# Patient Record
Sex: MALE | Race: WHITE | NOT HISPANIC OR LATINO | Employment: FULL TIME | URBAN - METROPOLITAN AREA
[De-identification: names, ages, dates, MRNs, and addresses within clinical notes are randomized per-mention and may not be internally consistent; named-entity substitution may affect disease eponyms.]

---

## 2023-10-01 PROBLEM — M25.662 STIFFNESS OF LEFT KNEE, NOT ELSEWHERE CLASSIFIED: Status: ACTIVE | Noted: 2023-10-01

## 2023-10-01 PROBLEM — M25.562 KNEE PAIN, LEFT: Status: ACTIVE | Noted: 2023-10-01

## 2023-10-01 PROBLEM — M25.469 EDEMA OF KNEE: Status: ACTIVE | Noted: 2023-10-01

## 2023-10-01 PROBLEM — S83.207A TEAR OF MENISCUS OF LEFT KNEE: Status: ACTIVE | Noted: 2023-10-01

## 2023-10-01 PROBLEM — S83.512A RUPTURE OF ANTERIOR CRUCIATE LIGAMENT OF LEFT KNEE: Status: ACTIVE | Noted: 2023-10-01

## 2023-10-04 ENCOUNTER — TREATMENT (OUTPATIENT)
Dept: PHYSICAL THERAPY | Facility: CLINIC | Age: 38
End: 2023-10-04
Payer: COMMERCIAL

## 2023-10-04 DIAGNOSIS — M25.469 EFFUSION OF LOWER LEG JOINT: ICD-10-CM

## 2023-10-04 DIAGNOSIS — M25.562 LEFT KNEE PAIN: Primary | ICD-10-CM

## 2023-10-04 DIAGNOSIS — S83.207D TEAR OF MENISCUS OF LEFT KNEE, SUBSEQUENT ENCOUNTER: ICD-10-CM

## 2023-10-04 DIAGNOSIS — M25.662 KNEE STIFF, LEFT: ICD-10-CM

## 2023-10-04 PROCEDURE — 97110 THERAPEUTIC EXERCISES: CPT | Mod: CQ,GP

## 2023-10-04 PROCEDURE — 97010 HOT OR COLD PACKS THERAPY: CPT | Mod: CQ,GP

## 2023-10-04 ASSESSMENT — PAIN - FUNCTIONAL ASSESSMENT: PAIN_FUNCTIONAL_ASSESSMENT: 0-10

## 2023-10-04 NOTE — PROGRESS NOTES
Physical Therapy    Physical Therapy Treatment    Patient Name: Damir Vazquez  MRN: [id number]  Today's Date: 10/4/2023         Assessment:  Pt with slow progress in ROM and quad strength thus far d/t non compliance with HEP/ice.   Continued emphasis on HEP and compliance for adequate ROM and quad strength going forward with protocol and progressions. Also, emphasized compliance with icing knee regularly for edema management. Pt voiced understanding. L quad lag present with SLR.    Plan:   Cont to progress per post op protocol     Current Problem  1. Left knee pain        2. Knee stiff, left        3. Tear of meniscus of left knee, subsequent encounter        4. Effusion of lower leg joint            Subjective   Pt reports he is a bit concerned about knee ext ROM. Does not feel like it is improving. Flex ROM feels better.  Has not been performing HEP or icing.       Precautions   Post op protocol  Pain  Pain Assessment: 0-10  1-2/10 tightness L knee     Objective     L knee ext AROM: -1*  L knee flex A/AROM with strap: 101*      Treatments:  Therapeutic exercise (27698):   Upright bike ROM (high seat) x5 min   QS with towel under ankle 2x10 reps 5'' hold  Heel slides with strap 2x10  L hip abd brace locked 2x10  L prone hip ext 2x10   HS sets 2x10 5'' hold   Calf /HS belt stretch x1 min       Manual Therapy (14807):   patella mobs all diretcions     Modalities:  Game ready to L knee x10 min

## 2023-10-09 ENCOUNTER — DOCUMENTATION (OUTPATIENT)
Dept: PHYSICAL THERAPY | Facility: CLINIC | Age: 38
End: 2023-10-09
Payer: COMMERCIAL

## 2023-10-09 NOTE — PROGRESS NOTES
Physical Therapy                 Therapy Communication Note    Patient Name: Damir Vazquez  MRN: [id number]  Today's Date: 10/9/2023     Discipline: Physical Therapy    Missed Visit Reason:  no show    Missed Time: No Show    Comment:

## 2023-10-11 ENCOUNTER — TREATMENT (OUTPATIENT)
Dept: PHYSICAL THERAPY | Facility: CLINIC | Age: 38
End: 2023-10-11
Payer: COMMERCIAL

## 2023-10-11 DIAGNOSIS — S83.207D TEAR OF MENISCUS OF LEFT KNEE, SUBSEQUENT ENCOUNTER: ICD-10-CM

## 2023-10-11 DIAGNOSIS — M25.562 LEFT KNEE PAIN: Primary | ICD-10-CM

## 2023-10-11 DIAGNOSIS — M25.469 EFFUSION OF LOWER LEG JOINT: ICD-10-CM

## 2023-10-11 DIAGNOSIS — M25.662 KNEE STIFF, LEFT: ICD-10-CM

## 2023-10-11 PROCEDURE — 97110 THERAPEUTIC EXERCISES: CPT | Mod: CQ,GP

## 2023-10-11 PROCEDURE — 97010 HOT OR COLD PACKS THERAPY: CPT | Mod: CQ,GP

## 2023-10-11 ASSESSMENT — PAIN SCALES - GENERAL: PAINLEVEL_OUTOF10: 2

## 2023-10-11 ASSESSMENT — PAIN - FUNCTIONAL ASSESSMENT: PAIN_FUNCTIONAL_ASSESSMENT: 0-10

## 2023-10-11 NOTE — PROGRESS NOTES
Physical Therapy  Physical Therapy    Physical Therapy Treatment    Patient Name: Damir Vazquez  MRN: [id number]  Today's Date: 10/11/2023  Time Calculation  Start Time: 1045  Stop Time: 1140  Time Calculation (min): 55 min    Current Problem  Problem List Items Addressed This Visit    None  Visit Diagnoses         Codes    Left knee pain    -  Primary M25.562    Knee stiff, left     M25.662    Tear of meniscus of left knee, subsequent encounter     S83.207D    Effusion of lower leg joint     M25.469             Subjective   Saw surgeon this AM. He was cleared for WBAT with brace locked in extension.  RTW on 10/16.    QS and ROM is improving since he has been working on it more at home.   Would like to get more knee extension so it is comparable to R LE.     Precautions  Precautions  Precautions Comment: post op protocol  Pain  Pain Assessment: 0-10  Pain Score: 2  Pain Location: Knee  Pain Orientation: Left    Objective   L knee ext AROM: 0*  L knee flex A/AROM with strap: 113*    Treatments:  Therapeutic exercise (93227):   Upright bike ROM (high seat) x5 min    L HSS x1 min , seated   L QS with towel under ankle 2x10 reps 5'' hold  L Heel slides with strap 2x10  Standing weight shift x2 min   Standing L hip abd, ext x10  Fitter board calf stretch x1 min   Seated HR/TR 2x10   Ambulation with BL crutches around clinic    Manual Therapy (53762):   patella mobs all diretcions     Modalities:  Game ready/CP to L knee x10 min      Assessment:  Pt progressing since last visit. Demonstrated improved knee flex and ext ROM. Improved QS as well however quad lag with SLR still present. Educated pt on continued emphasis on QS and SLR to build foundation for Wb'ing.  Progressed Wb'ing and gait with crutches today. Pt hesitant but did well as tx went on. Fatigued with pain and edema post tx. Responds well to pain and edema management.     Plan:   Cont to progress per post op protocol

## 2023-10-16 ENCOUNTER — TREATMENT (OUTPATIENT)
Dept: PHYSICAL THERAPY | Facility: CLINIC | Age: 38
End: 2023-10-16
Payer: COMMERCIAL

## 2023-10-16 DIAGNOSIS — M25.662 STIFFNESS OF LEFT KNEE, NOT ELSEWHERE CLASSIFIED: ICD-10-CM

## 2023-10-16 DIAGNOSIS — S83.207D UNSPECIFIED TEAR OF UNSPECIFIED MENISCUS, CURRENT INJURY, LEFT KNEE, SUBSEQUENT ENCOUNTER: ICD-10-CM

## 2023-10-16 DIAGNOSIS — M25.469 EFFUSION, UNSPECIFIED KNEE: ICD-10-CM

## 2023-10-16 DIAGNOSIS — M25.562 PAIN IN LEFT KNEE: ICD-10-CM

## 2023-10-16 PROCEDURE — 97110 THERAPEUTIC EXERCISES: CPT | Mod: CQ,GP

## 2023-10-16 ASSESSMENT — PAIN SCALES - GENERAL: PAINLEVEL_OUTOF10: 0 - NO PAIN

## 2023-10-16 ASSESSMENT — PAIN - FUNCTIONAL ASSESSMENT: PAIN_FUNCTIONAL_ASSESSMENT: 0-10

## 2023-10-16 NOTE — PROGRESS NOTES
Physical Therapy  Physical Therapy    Physical Therapy Treatment    Patient Name: Damir Vazquez  MRN: [id number]  Today's Date: 10/16/2023  Time Calculation  Start Time: 0920  Stop Time: 1000  Time Calculation (min): 40 min    Current Problem  Problem List Items Addressed This Visit             ICD-10-CM    Stiffness of left knee, not elsewhere classified M25.662     Other Visit Diagnoses         Codes    Unspecified tear of unspecified meniscus, current injury, left knee, subsequent encounter     S83.207D    Effusion, unspecified knee     M25.469    Pain in left knee     M25.562               Subjective   Pt has been working on increasing Wb'ing in L LE. Doing well with quad weakness, as expected. ROM is improving as well. Has been focusing on heel to toe as much as he can with brace locked.  RTW today.     Precautions  Precautions  Precautions Comment: post op protocol  Pain  Pain Assessment: 0-10  Pain Score: 0 - No pain  Pain Location: Knee  Pain Orientation: Left    Objective   L knee ext AROM: -2*  L knee flex A/AROM with strap: 113* (not measured today 10/16)     Treatments:  Therapeutic exercise (09208):   Upright bike ROM (high seat) x5 min    L HSS x1 min , seated   L QS with towel under ankle 2x10 reps 5'' hold  Fitter board calf stretch x1 min   L Heel slides with strap 2x10 held    Standing weight shift x2 min   Standing L hip abd, ext x10 added to HEP   L TKE GTB added to HEP   Seated HR/TR 2x10   Ambulation with BL crutches around clinic  L SLR x10     Manual Therapy (02147):   patella mobs all diretcions     Modalities:  Game ready/CP to L knee x10 min (held d/t time)      Assessment:  Pt progressing since last visit. Demonstrated improved knee flex and ext ROM. Improved QS as well however quad lag with SLR still present. Educated pt on continued emphasis on QS and SLR to build foundation for Wb'ing.  Improved strength and endurance with Wb'ing as well with some trembling when perforing TKE  Updated HEP.       Plan:   Cont to progress per post op protocol      UPDATED HEP   Access Code: [email or web address]  URL: [email or web address]  Date: 10/16/2023  Prepared by: Rose Fang    Exercises  - Seated Hamstring Stretch  - 1 x daily - 7 x weekly - 2 sets - 10 reps - 30 sec-1 min  hold  - Gastroc Stretch on Wall  - 1 x daily - 7 x weekly - 2 sets - 30 sec-1 min  hold  - Quad Setting and Stretching  - 2 x daily - 7 x weekly - 2 sets - 10 reps - 5 sec hold  - Supine Heel Slide with Strap  - 2 x daily - 7 x weekly - 2 sets - 10 reps  - Active Straight Leg Raise with Quad Set (Mirrored)  - 2 x daily - 7 x weekly - 2 sets - 10 reps  - Seated Heel Toe Raises  - 1 x daily - 7 x weekly - 2 sets - 10 reps  - Standing Terminal Knee Extension with Resistance  - 1 x daily - 7 x weekly - 2-3 sets - 10 reps - 2-5 sec hold  - Standing Hip Abduction with Counter Support  - 1 x daily - 7 x weekly - 2 sets - 10 reps  - Standing Hip Extension with Counter Support  - 1 x daily - 7 x weekly - 2 sets - 10 reps

## 2023-10-19 ENCOUNTER — TREATMENT (OUTPATIENT)
Dept: PHYSICAL THERAPY | Facility: CLINIC | Age: 38
End: 2023-10-19
Payer: COMMERCIAL

## 2023-10-19 DIAGNOSIS — M25.662 STIFFNESS OF LEFT KNEE, NOT ELSEWHERE CLASSIFIED: ICD-10-CM

## 2023-10-19 DIAGNOSIS — M25.469 EFFUSION, UNSPECIFIED KNEE: ICD-10-CM

## 2023-10-19 DIAGNOSIS — M25.562 PAIN IN LEFT KNEE: Primary | ICD-10-CM

## 2023-10-19 DIAGNOSIS — S83.207D UNSPECIFIED TEAR OF UNSPECIFIED MENISCUS, CURRENT INJURY, LEFT KNEE, SUBSEQUENT ENCOUNTER: ICD-10-CM

## 2023-10-19 PROCEDURE — 97110 THERAPEUTIC EXERCISES: CPT | Mod: GP | Performed by: PHYSICAL THERAPIST

## 2023-10-19 ASSESSMENT — PAIN SCALES - GENERAL: PAINLEVEL_OUTOF10: 0 - NO PAIN

## 2023-10-19 ASSESSMENT — PAIN - FUNCTIONAL ASSESSMENT: PAIN_FUNCTIONAL_ASSESSMENT: 0-10

## 2023-10-19 NOTE — PROGRESS NOTES
Physical Therapy  Physical Therapy    Physical Therapy Treatment    Patient Name: Damir Vazqeuz  MRN: [id number]  Today's Date: 10/19/2023  Time Calculation  Start Time: 0915  Stop Time: 1000  Time Calculation (min): 45 min    Current Problem  Problem List Items Addressed This Visit             ICD-10-CM    Effusion, unspecified knee M25.469    Relevant Orders    Follow Up In Physical Therapy    Pain in left knee - Primary M25.562    Relevant Orders    Follow Up In Physical Therapy    Stiffness of left knee, not elsewhere classified M25.662    Relevant Orders    Follow Up In Physical Therapy    Unspecified tear of unspecified meniscus, current injury, left knee, subsequent encounter S83.207D    Relevant Orders    Follow Up In Physical Therapy     Subjective   Pt notes he weaned himself from his crutches this week and still walking with brace locked. Notes only mild soreness but no pain. Feels he is getting stronger.     Precautions  Precautions  Precautions Comment: post op protocol (ACL and mesicus repair)  Pain  Pain Assessment: 0-10  Pain Score: 0 - No pain    Objective   L knee ext AROM: -2*  L knee flex A/AROM with strap: 127*    Treatments:  Therapeutic exercise (86466): x 40    Upright bike ROM (high seat) x5 min    L HSS x1 min , seated   L QS with towel under ankle 2x10 reps 5'' hold  Fitter board calf stretch x1 min   L Heel slides with strap 2x10  Standing weight shift x2 min brace unlocked  SLS L LE 20 sec x 3 reps brace locked   Standing L hip abd, ext, flex with RTB 2 x 10  L TKE GTB added to HEP   Stand HR/TR 2x10   L SLR 2 x 10     Manual Therapy (93923): x 5 min  patella mobs all diretcions     Modalities:- held due to time today  Game ready/CP to L knee x10 min     Assessment:  Pt overall tolerated treatment well and did well with WB activity. Will progress further WB activity but stay below 90* flexion per protocol and physician recommendation.     Plan:  Cont to progress per post op protocol.  Update from MD allow to unlock brace if quad strength  is good and no lag present.     Goals: refer to legacy documentation for established POC and goals.

## 2023-10-25 ENCOUNTER — TREATMENT (OUTPATIENT)
Dept: PHYSICAL THERAPY | Facility: CLINIC | Age: 38
End: 2023-10-25
Payer: COMMERCIAL

## 2023-10-25 DIAGNOSIS — M25.562 PAIN IN LEFT KNEE: ICD-10-CM

## 2023-10-25 DIAGNOSIS — M25.469 EFFUSION, UNSPECIFIED KNEE: ICD-10-CM

## 2023-10-25 DIAGNOSIS — M25.662 STIFFNESS OF LEFT KNEE, NOT ELSEWHERE CLASSIFIED: ICD-10-CM

## 2023-10-25 DIAGNOSIS — S83.207D UNSPECIFIED TEAR OF UNSPECIFIED MENISCUS, CURRENT INJURY, LEFT KNEE, SUBSEQUENT ENCOUNTER: ICD-10-CM

## 2023-10-25 PROCEDURE — 97110 THERAPEUTIC EXERCISES: CPT | Mod: CQ,GP

## 2023-10-25 ASSESSMENT — PAIN - FUNCTIONAL ASSESSMENT: PAIN_FUNCTIONAL_ASSESSMENT: 0-10

## 2023-10-25 ASSESSMENT — PAIN SCALES - GENERAL: PAINLEVEL_OUTOF10: 0 - NO PAIN

## 2023-10-25 NOTE — PROGRESS NOTES
Physical Therapy  Physical Therapy    Physical Therapy Treatment    Patient Name: Damir Vazquez  MRN: [id number]  Today's Date: 10/25/2023  Time Calculation  Start Time: 1045  Stop Time: 1145  Time Calculation (min): 60 min    Current Problem  Problem List Items Addressed This Visit             ICD-10-CM    Effusion, unspecified knee M25.469    Pain in left knee M25.562    Stiffness of left knee, not elsewhere classified M25.662    Unspecified tear of unspecified meniscus, current injury, left knee, subsequent encounter S83.207D       Subjective   Pt reports he has been weaned off the crutches for 1.5 weeks now but has been more sore the last 4 days. He is not sure why but is concerned of re-injury. 4/10 pain in anterior to medial knee. Brace still locked into extension but it slips down so it is on his patella which is not comfortable. Not icing.   Feels stronger as he is increasing his ambulation.   Precautions  Precautions  Precautions Comment: ACL and mensicus post op protocol  Pain  Pain Assessment: 0-10  Pain Score: 0 - No pain  Pain Location: Knee  Pain Orientation: Left    Objective   L knee ext AROM: -3*  L knee flex A/AROM with strap: 127*    Treatments:  Therapeutic exercise (43136): x 40    Upright bike ROM (high seat) x5 min    L HSS x1 min , seated   L SLR 2 x 10   L QS with towel under ankle 2x10 reps 5'' hold  Fitter board calf stretch x1 min   Standing weight shift x2 min brace unlocked  Standing 3 way hip x10 ea B UE support, brace unlocked  Standing marches   SLS L LE 20 sec x 3 reps brace locked   L TKE GTB added to HEP   Stand HR/TR 2x10     Gait tx cane and railing with brace unlocked x 5 min   L TKE 3 sec hold at railing 2x10     Neuro Re-ed: 2 min  AE mod tandem x1 min     Manual Therapy (15572): x 3 min  patella mobs all directions  Scar massage   STM to L VMO    Modalities:- 10 min   Game ready/CP to L knee x10 min     Assessment:  Anterior to medial knee pain steadily increased with  bike warm up as time went on, which has not happened in prior appts.   Min quad lag present with SLR.  Unlocked brace to 90* for therapy session. Pt demonstrated improved gait with practice with B UE support cane and railing. Pt to keep brace locked into ext when outside of home.  Pt voiced pain with HR so only performed 1 set. Game ready post tx for pain and edema management.   Continued emphasis on HEP compliance to increase quad strength for progression.   Plan:  Cont to progress per post op protocol. Update from MD allow to unlock brace if quad strength  is good and no lag present.     Goals: refer to legacy documentation for established POC and goals.

## 2023-10-31 ENCOUNTER — DOCUMENTATION (OUTPATIENT)
Dept: PHYSICAL THERAPY | Facility: CLINIC | Age: 38
End: 2023-10-31
Payer: COMMERCIAL

## 2023-10-31 NOTE — PROGRESS NOTES
Physical Therapy                 Therapy Communication Note    Patient Name: Damir Vazquez  MRN: [id number]  Today's Date: 10/31/2023     Discipline: Physical Therapy    Missed Visit Reason:  LVMM regarding no show and reminder of next appointment on 11-2-23    Missed Time: No Show    Comment:

## 2023-11-02 ENCOUNTER — TREATMENT (OUTPATIENT)
Dept: PHYSICAL THERAPY | Facility: CLINIC | Age: 38
End: 2023-11-02
Payer: COMMERCIAL

## 2023-11-02 DIAGNOSIS — S83.207D UNSPECIFIED TEAR OF UNSPECIFIED MENISCUS, CURRENT INJURY, LEFT KNEE, SUBSEQUENT ENCOUNTER: ICD-10-CM

## 2023-11-02 DIAGNOSIS — M25.469 EFFUSION OF KNEE, UNSPECIFIED LATERALITY: ICD-10-CM

## 2023-11-02 DIAGNOSIS — M25.562 PAIN IN LEFT KNEE: Primary | ICD-10-CM

## 2023-11-02 DIAGNOSIS — M25.662 STIFFNESS OF LEFT KNEE, NOT ELSEWHERE CLASSIFIED: ICD-10-CM

## 2023-11-02 PROCEDURE — 97110 THERAPEUTIC EXERCISES: CPT | Mod: GP | Performed by: PHYSICAL THERAPIST

## 2023-11-02 ASSESSMENT — PAIN - FUNCTIONAL ASSESSMENT: PAIN_FUNCTIONAL_ASSESSMENT: 0-10

## 2023-11-02 ASSESSMENT — PAIN SCALES - GENERAL: PAINLEVEL_OUTOF10: 0 - NO PAIN

## 2023-11-02 NOTE — PROGRESS NOTES
"Physical Therapy    Physical Therapy Treatment    Patient Name: Damir Vazquez  MRN: [id number]  Today's Date: 2023  Time Calculation  Start Time: 917  Stop Time: 959  Time Calculation (min): 42 min    Current Problem  Problem List Items Addressed This Visit             ICD-10-CM    Effusion, unspecified knee M25.469    Pain in left knee - Primary M25.562    Stiffness of left knee, not elsewhere classified M25.662    Unspecified tear of unspecified meniscus, current injury, left knee, subsequent encounter S83.207D        Subjective   Pt overall notes he has been doing his exercises her and there but not daily. Note she feels his knee is getting stronger.     Precautions  Precautions  Precautions Comment: ACL and mensicus post op protocol    Pain  Pain Assessment: 0-10  Pain Score: 0 - No pain      Objective   121 flex to 0    Treatments:  Therapeutic exercise (55636): x 40    Upright bike ROM (high seat) x5 min    L HSS x1 min standing at steps   Fitter board calf stretch x1 min   Step up 6\" 2 x 10 L LE for/lateral- no brace  Step down 4\" L LE on step 2 x 10 - no brace   Wall sits (0-60 flex) 2 x 10   L SLR 2 x 10 no brace   L QS with towel under ankle 2x10 reps 5'' hold- hep  Standing 3 way hip x10 ea B UE support  Standing marches Airex x 2 min no brace   SLS L LE 20 sec x 3 reps no brace   Stand HR/TR 2x10 1/2 foam roll   Gait at railing without brace with VC's for correct gait pattern     Neuro Re-ed: 2 min  AE mod tandem x1 min     Assessment:  Pt overall progressed with WB activity today with no brace donned to allow for improve quad control. VC's still needed for correct gait pattern in there clinic and reccommended dot perform HEP daily at home.     Plan:  Continue to progress WB activity and stability with no brace in the clinic but brace for gait and WB out side of clinic     Goals:   ST. Reduce pain at worst to 1/10 with all prior activity.   ST. Increase knee flex to 120* for gait and " stairs., by week 4   Pain: Reduce pain at worst to 0/10 with all prior activity., by week 6   Range Of Motion/Joint Mobility: Increase knee flexion to 135 degrees and ext to 0 degrees for gait, stairs, and ADL's., by week 12   Strength: Pt to increase LE strength to grossly 5/5 for improved gait, return to run and ADL's., by week 12   Pt to increase LEFS score by 10-15 points to display overall increased function, by week 12   Pt to be independent with a HEP for self management., by week 12

## 2023-11-07 ENCOUNTER — TREATMENT (OUTPATIENT)
Dept: PHYSICAL THERAPY | Facility: CLINIC | Age: 38
End: 2023-11-07
Payer: COMMERCIAL

## 2023-11-07 DIAGNOSIS — M25.562 LEFT KNEE PAIN, UNSPECIFIED CHRONICITY: ICD-10-CM

## 2023-11-07 DIAGNOSIS — S83.207D UNSPECIFIED TEAR OF UNSPECIFIED MENISCUS, CURRENT INJURY, LEFT KNEE, SUBSEQUENT ENCOUNTER: ICD-10-CM

## 2023-11-07 DIAGNOSIS — M25.562 PAIN IN LEFT KNEE: Primary | ICD-10-CM

## 2023-11-07 DIAGNOSIS — S83.512D RUPTURE OF ANTERIOR CRUCIATE LIGAMENT OF LEFT KNEE, SUBSEQUENT ENCOUNTER: ICD-10-CM

## 2023-11-07 DIAGNOSIS — M25.662 STIFFNESS OF LEFT KNEE, NOT ELSEWHERE CLASSIFIED: ICD-10-CM

## 2023-11-07 PROCEDURE — 97110 THERAPEUTIC EXERCISES: CPT | Mod: GP | Performed by: PHYSICAL THERAPIST

## 2023-11-07 ASSESSMENT — PAIN - FUNCTIONAL ASSESSMENT: PAIN_FUNCTIONAL_ASSESSMENT: 0-10

## 2023-11-07 ASSESSMENT — PAIN SCALES - GENERAL: PAINLEVEL_OUTOF10: 0 - NO PAIN

## 2023-11-07 NOTE — PROGRESS NOTES
"Physical Therapy    Physical Therapy Treatment    Patient Name: Damir Vazquez  MRN: [id number]  Today's Date: 2023  Time Calculation  Start Time: 1535  Stop Time: 1618  Time Calculation (min): 43 min    Current Problem  Problem List Items Addressed This Visit             ICD-10-CM    Pain in left knee - Primary M25.562    Rupture of anterior cruciate ligament of left knee S83.512A    Stiffness of left knee, not elsewhere classified M25.662    Unspecified tear of unspecified meniscus, current injury, left knee, subsequent encounter S83.207D        Subjective   Pt overall notes he feels he is doing well with the brace unlocked. Notes he feels still some mild weakness in the knee descending stairs.,   Precautions  Precautions  Precautions Comment: ACL and mensicus post op protocol    Pain  Pain Assessment: 0-10  Pain Score: 0 - No pain      Objective   No measures     Treatments:  Therapeutic exercise (19242): x 37    Upright bike ROM (high seat) x5 min    L HSS x1 min standing at steps   Fitter board calf stretch x1 min   Step up 8\" 2 x 10 L LE for/lateral- no brace  Step down 6\" L LE on step 2 x 10 - no brace   Wall sits (0-60 flex) 2 x 10   L SLR 2 x 10 no brace - Held added to HEP after today   Standing SLS L LE 3 way hip x10 ea no UE support   Standing marches Airex x 2 min no brace   SLS L LE 20 sec x 3 reps no brace   Stand HR/TR 2x10 1/2 foam roll   Gait at railing without brace with VC's for correct gait pattern     Neuro Re-ed: 6 min  AE mod tandem x1 min   Fitterboard x 2 min ea 2 pos     Assessment:  Pt overall tolerated added stability and strengthening today per protocol restrictions. Doing much better with stairs and overall gait.     Plan:  Continue to progress and plan to discharge brace per Dr recommended next visit.     Goals:  ST. Reduce pain at worst to 1/10 with all prior activity.   ST. Increase knee flex to 120* for gait and stairs., by week 4   Pain: Reduce pain at worst to 0/10 " with all prior activity., by week 6   Range Of Motion/Joint Mobility: Increase knee flexion to 135 degrees and ext to 0 degrees for gait, stairs, and ADL's., by week 12   Strength: Pt to increase LE strength to grossly 5/5 for improved gait, return to run and ADL's., by week 12   Pt to increase LEFS score by 10-15 points to display overall increased function, by week 12   Pt to be independent with a HEP for self management., by week 12

## 2023-11-09 ENCOUNTER — TREATMENT (OUTPATIENT)
Dept: PHYSICAL THERAPY | Facility: CLINIC | Age: 38
End: 2023-11-09
Payer: COMMERCIAL

## 2023-11-09 DIAGNOSIS — M25.662 STIFFNESS OF LEFT KNEE, NOT ELSEWHERE CLASSIFIED: ICD-10-CM

## 2023-11-09 DIAGNOSIS — M25.562 PAIN IN LEFT KNEE: Primary | ICD-10-CM

## 2023-11-09 DIAGNOSIS — S83.207D UNSPECIFIED TEAR OF UNSPECIFIED MENISCUS, CURRENT INJURY, LEFT KNEE, SUBSEQUENT ENCOUNTER: ICD-10-CM

## 2023-11-09 DIAGNOSIS — S83.512D RUPTURE OF ANTERIOR CRUCIATE LIGAMENT OF LEFT KNEE, SUBSEQUENT ENCOUNTER: ICD-10-CM

## 2023-11-09 PROCEDURE — 97110 THERAPEUTIC EXERCISES: CPT | Mod: GP | Performed by: PHYSICAL THERAPIST

## 2023-11-09 ASSESSMENT — PAIN - FUNCTIONAL ASSESSMENT: PAIN_FUNCTIONAL_ASSESSMENT: 0-10

## 2023-11-09 ASSESSMENT — PAIN SCALES - GENERAL: PAINLEVEL_OUTOF10: 0 - NO PAIN

## 2023-11-09 NOTE — PROGRESS NOTES
"Physical Therapy    Physical Therapy Treatment    Patient Name: Damir Vazquez  MRN: [id number]  Today's Date: 11/9/2023  Time Calculation  Start Time: 0916  Stop Time: 0946  Time Calculation (min): 30 min    Current Problem  Problem List Items Addressed This Visit             ICD-10-CM    Pain in left knee - Primary M25.562    Rupture of anterior cruciate ligament of left knee S83.512A    Stiffness of left knee, not elsewhere classified M25.662    Unspecified tear of unspecified meniscus, current injury, left knee, subsequent encounter S83.207D        Subjective   Pt reports he needs to leave early today due to a meeting at 10 am. Notes he is still feeling no pain and continues to feel stronger.     Precautions  Precautions  Precautions Comment: ACL and mensicus post op protocol    Pain  Pain Assessment: 0-10  Pain Score: 0 - No pain      Objective   No measures today     Treatments:  Therapeutic exercise (46404): x  24 min    Upright bike ROM (high seat) x5 min    L HSS x1 min standing at steps   Fitter board calf stretch x1 min   Step up 8\" 2 x 10 L LE for/lateral- no brace  Step down 6\" L LE on step 2 x 10 - no brace   Wall sits (0-60 flex) 2 x 10   Standing SLS L LE 3 way hip x10 ea no UE support   Standing marches Airex x 2 min no brace - held   SLS L LE 20 sec x 3 reps no brace - held   Stand HR/TR 2x10 1/2 foam roll - held   Gait at railing without brace with VC's for correct gait pattern - held     Neuro Re-ed: 6 min  AE mod tandem x1 min   Fitterboard x 2 min ea 2 pos     Assessment:  Pt overall tolerated treatment well today but did have to leave early due to a meeting and was not able to complete treatment in its entirety. Pt was recommended to start weaning fully out of the brace over this weekend and to only use if any instability or pain is noted. Pt also recommended to focus on gait mechanics. Pt voiced understanding.     Plan:  Progress stability and WB activity next visit with leg press below 90 " flex.     Goals:   ST. Reduce pain at worst to 1/10 with all prior activity.   ST. Increase knee flex to 120* for gait and stairs., by week 4   Pain: Reduce pain at worst to 0/10 with all prior activity., by week 6   Range Of Motion/Joint Mobility: Increase knee flexion to 135 degrees and ext to 0 degrees for gait, stairs, and ADL's., by week 12   Strength: Pt to increase LE strength to grossly 5/5 for improved gait, return to run and ADL's., by week 12   Pt to increase LEFS score by 10-15 points to display overall increased function, by week 12   Pt to be independent with a HEP for self management., by week 12

## 2023-11-14 ENCOUNTER — DOCUMENTATION (OUTPATIENT)
Dept: PHYSICAL THERAPY | Facility: CLINIC | Age: 38
End: 2023-11-14
Payer: COMMERCIAL

## 2023-11-14 ENCOUNTER — APPOINTMENT (OUTPATIENT)
Dept: PHYSICAL THERAPY | Facility: CLINIC | Age: 38
End: 2023-11-14
Payer: COMMERCIAL

## 2023-11-14 NOTE — PROGRESS NOTES
Physical Therapy                 Therapy Communication Note    Patient Name: Damir Vazquez  MRN: [id number]  Today's Date: 11/14/2023     Discipline: Physical Therapy    Missed Visit Reason: Pt left message to cx, no reason given.     Missed Time: Cancel    Comment:

## 2023-11-16 ENCOUNTER — TREATMENT (OUTPATIENT)
Dept: PHYSICAL THERAPY | Facility: CLINIC | Age: 38
End: 2023-11-16
Payer: COMMERCIAL

## 2023-11-16 DIAGNOSIS — M25.662 STIFFNESS OF LEFT KNEE, NOT ELSEWHERE CLASSIFIED: ICD-10-CM

## 2023-11-16 DIAGNOSIS — M25.562 PAIN IN LEFT KNEE: Primary | ICD-10-CM

## 2023-11-16 DIAGNOSIS — S83.207D UNSPECIFIED TEAR OF UNSPECIFIED MENISCUS, CURRENT INJURY, LEFT KNEE, SUBSEQUENT ENCOUNTER: ICD-10-CM

## 2023-11-16 DIAGNOSIS — S83.512D RUPTURE OF ANTERIOR CRUCIATE LIGAMENT OF LEFT KNEE, SUBSEQUENT ENCOUNTER: ICD-10-CM

## 2023-11-16 PROCEDURE — 97110 THERAPEUTIC EXERCISES: CPT | Mod: GP | Performed by: PHYSICAL THERAPIST

## 2023-11-16 PROCEDURE — 97112 NEUROMUSCULAR REEDUCATION: CPT | Mod: GP | Performed by: PHYSICAL THERAPIST

## 2023-11-16 ASSESSMENT — PAIN DESCRIPTION - DESCRIPTORS: DESCRIPTORS: SORE

## 2023-11-16 ASSESSMENT — PAIN SCALES - GENERAL: PAINLEVEL_OUTOF10: 1

## 2023-11-16 ASSESSMENT — PAIN - FUNCTIONAL ASSESSMENT: PAIN_FUNCTIONAL_ASSESSMENT: 0-10

## 2023-11-16 NOTE — PROGRESS NOTES
"Physical Therapy    Physical Therapy Treatment    Patient Name: Damir Vazquez  MRN: [id number]  Today's Date: 2023  Time Calculation  Start Time: 1230  Stop Time: 1310  Time Calculation (min): 40 min    Current Problem  Problem List Items Addressed This Visit             ICD-10-CM    Pain in left knee - Primary M25.562    Rupture of anterior cruciate ligament of left knee S83.512A    Stiffness of left knee, not elsewhere classified M25.662    Unspecified tear of unspecified meniscus, current injury, left knee, subsequent encounter S83.207D        Subjective   Pt reports he had some soreness after discharging the brace over the past week. Notes still some favoring of the leg when walking and on stairs. .     Precautions  Precautions  Precautions Comment: ACL and mensicus post op protocol    Pain  Pain Assessment: 0-10  Pain Score: 1  Pain Descriptors: Sore    Objective   No measures today     Treatments:  Therapeutic exercise (36182): x  32 min   Upright bike ROM (high seat) x5 min    L HSS x1 min standing at steps   Fitter board calf stretch x1 min   Step up 8\" 2 x 10 L LE for/lateral  Step down 6\" L LE on step 2 x 10  Wall sits (0-60 flex) 2 x 10   SLS L LE  KB swing CW/CCW 10 x ea   Stand HR/TR 2x10 1/2 foam roll   Lunges forward 2 x 10, lateral lunge   Leg press double leg 100# 2 x 10 (stay below 90* flex)  Leg press single leg L 50# 2 x 10 (stay below 90* flex)  TKE GTB 2 x 10 L LE    Neuro Re-ed: x 8 min  Standing marches Airex x 2 min  AE mod tandem x1 min   Fitterboard x 2 min ea 2 pos     Assessment:  Pt overall tolerated tx well and progressed with program with leg press and SLS proprioceptive activity. Progressing well with no use of brace but recommended to focus on gait mechanics and avoid favoring L LE.     Plan:  Continue per protocol for the L ACL/root repair.     Goals:    ST. Reduce pain at worst to 1/10 with all prior activity.   ST. Increase knee flex to 120* for gait and stairs., " by week 4   Pain: Reduce pain at worst to 0/10 with all prior activity., by week 6   Range Of Motion/Joint Mobility: Increase knee flexion to 135 degrees and ext to 0 degrees for gait, stairs, and ADL's., by week 12   Strength: Pt to increase LE strength to grossly 5/5 for improved gait, return to run and ADL's., by week 12   Pt to increase LEFS score by 10-15 points to display overall increased function, by week 12   Pt to be independent with a HEP for self management., by week 12

## 2023-11-21 ENCOUNTER — DOCUMENTATION (OUTPATIENT)
Dept: PHYSICAL THERAPY | Facility: CLINIC | Age: 38
End: 2023-11-21
Payer: COMMERCIAL

## 2023-11-21 NOTE — PROGRESS NOTES
Physical Therapy                 Therapy Communication Note    Patient Name: Damir Vazquez  MRN: [id number]  Today's Date: 11/21/2023     Discipline: Physical Therapy    Missed Visit Reason:      Missed Time: No Show    Comment:

## 2023-11-28 ENCOUNTER — DOCUMENTATION (OUTPATIENT)
Dept: PHYSICAL THERAPY | Facility: CLINIC | Age: 38
End: 2023-11-28
Payer: COMMERCIAL

## 2023-11-28 NOTE — PROGRESS NOTES
Physical Therapy                 Therapy Communication Note    Patient Name: aDmir Vazquez  MRN: [id number]  Today's Date: 11/28/2023     Discipline: Physical Therapy    Missed Visit Reason: LVMM regarding 2nd no show in a row. Left our  number for berta back.     Missed Time: No Show    Comment:

## 2023-11-30 ENCOUNTER — APPOINTMENT (OUTPATIENT)
Dept: PHYSICAL THERAPY | Facility: CLINIC | Age: 38
End: 2023-11-30
Payer: COMMERCIAL

## 2024-01-18 ENCOUNTER — DOCUMENTATION (OUTPATIENT)
Dept: PHYSICAL THERAPY | Facility: CLINIC | Age: 39
End: 2024-01-18
Payer: COMMERCIAL

## 2024-01-18 NOTE — PROGRESS NOTES
Physical Therapy    Discharge Summary    Name: Damir Vazquez  MRN: [id number]  : 1985  Date: 24    Discharge Summary: PT    Discharge Information: Date of discharge 24, Date of last visit 23, Date of evaluation 7/10/23, Number of attended visits 13, Referred by Zita Craig, and Referred for L knee ACL tear and repair     Therapy Summary: Pt was seen for 13 visits for his L knee ACL tear pre-op and post op. Pt received manual tx, there-ex, neuro re-ed, gait and a HEP.     Discharge Status: Pt was last seen on 23 and he cx and no showed to his remaining scheduled visits and will be D/C'ed at this time.    Rehab Discharge Reason: Failed to schedule and/or keep follow-up appointment(s)

## 2025-05-14 ENCOUNTER — HOSPITAL ENCOUNTER (OUTPATIENT)
Dept: RADIOLOGY | Facility: CLINIC | Age: 40
Discharge: HOME | End: 2025-05-14
Payer: COMMERCIAL

## 2025-05-14 DIAGNOSIS — M25.562 PAIN IN LEFT KNEE: ICD-10-CM

## 2025-05-14 PROCEDURE — 73562 X-RAY EXAM OF KNEE 3: CPT | Mod: LEFT SIDE | Performed by: RADIOLOGY

## 2025-05-14 PROCEDURE — 73562 X-RAY EXAM OF KNEE 3: CPT | Mod: LT

## 2025-07-16 ENCOUNTER — HOSPITAL ENCOUNTER (OUTPATIENT)
Dept: RADIOLOGY | Facility: CLINIC | Age: 40
Discharge: HOME | End: 2025-07-16
Payer: COMMERCIAL

## 2025-07-16 DIAGNOSIS — M25.512 PAIN IN LEFT SHOULDER: ICD-10-CM

## 2025-07-16 PROCEDURE — 73030 X-RAY EXAM OF SHOULDER: CPT | Mod: LT

## 2025-07-16 PROCEDURE — 73030 X-RAY EXAM OF SHOULDER: CPT | Mod: LEFT SIDE | Performed by: RADIOLOGY
